# Patient Record
Sex: MALE | Race: WHITE | NOT HISPANIC OR LATINO | Employment: FULL TIME | URBAN - METROPOLITAN AREA
[De-identification: names, ages, dates, MRNs, and addresses within clinical notes are randomized per-mention and may not be internally consistent; named-entity substitution may affect disease eponyms.]

---

## 2017-11-12 ENCOUNTER — HOSPITAL ENCOUNTER (EMERGENCY)
Facility: CLINIC | Age: 27
Discharge: HOME | End: 2017-11-13
Attending: EMERGENCY MEDICINE | Admitting: EMERGENCY MEDICINE
Payer: COMMERCIAL

## 2017-11-12 ENCOUNTER — APPOINTMENT (OUTPATIENT)
Dept: GENERAL RADIOLOGY | Facility: CLINIC | Age: 27
End: 2017-11-12
Attending: EMERGENCY MEDICINE
Payer: COMMERCIAL

## 2017-11-12 VITALS
HEIGHT: 69 IN | RESPIRATION RATE: 18 BRPM | OXYGEN SATURATION: 98 % | BODY MASS INDEX: 20.73 KG/M2 | TEMPERATURE: 98.1 F | DIASTOLIC BLOOD PRESSURE: 75 MMHG | WEIGHT: 140 LBS | SYSTOLIC BLOOD PRESSURE: 129 MMHG
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
## 2017-11-12 DIAGNOSIS — M54.2 CERVICALGIA: ICD-10-CM

## 2017-11-12 PROCEDURE — 99283 EMERGENCY DEPT VISIT LOW MDM: CPT | Mod: Z6 | Performed by: EMERGENCY MEDICINE

## 2017-11-12 PROCEDURE — 99283 EMERGENCY DEPT VISIT LOW MDM: CPT | Performed by: EMERGENCY MEDICINE

## 2017-11-12 PROCEDURE — 72040 X-RAY EXAM NECK SPINE 2-3 VW: CPT

## 2017-11-12 NOTE — ED AVS SNAPSHOT
[facility] Emergency Department    [address] [address]    Phone:  [phone]    Fax:  [phone]                                       Massimo Hinson   MRN: [id number]    Department:  [facility] Emergency Department   Date of Visit:  11/12/2017           Patient Information     Date Of Birth          1990        Your diagnoses for this visit were:     Cervicalgia     Skiing accident, initial encounter        You were seen by Sterling Cuevas MD.        Discharge Instructions       Drink plenty of fluids.  Use acetaminophen and ibuprofen for symptoms.  Return to the emergency department for worsening symptoms or other concerns, otherwise follow up in clinic if not improved in 4-5 days.    24 Hour Appointment Hotline       To make an appointment at any [facility] clinic, call [phone] ([phone]). If you don't have a family doctor or clinic, we will help you find one. [facility] clinics are conveniently located to serve the needs of you and your family.             Review of your medicines      Our records show that you are taking the medicines listed below. If these are incorrect, please call your family doctor or clinic.        Dose / Directions Last dose taken    IBUPROFEN 200 200 MG Tabs        PRN   Refills:  0        NO ACTIVE MEDICATIONS        Refills:  0                Procedures and tests performed during your visit     Cervical spine XR, 2-3 views      Orders Needing Specimen Collection     None      Pending Results     Date and Time Order Name Status Description    11/12/2017 2307 Cervical spine XR, 2-3 views Preliminary             Pending Culture Results     No orders found for last 3 day(s).            Pending Results Instructions     If you had any lab results that were not finalized at the time of your Discharge, you can call the ED Lab Result RN at [phone]. You will be contacted by this team for any positive Lab results or changes in  "treatment. The nurses are available 7 days a week from 10A to 6:30P.  You can leave a message 24 hours per day and they will return your call.        Test Results From Your Hospital Stay        2017 12:09 AM      Narrative     XR CERVICAL SPINE 2-3 VIEWS  2017 11:49 PM      HISTORY: Fell skiing, posterior neck pain around C3-4.      COMPARISON: 10/6/2005.        Impression     IMPRESSION: No acute fracture or malalignment. No prevertebral soft  tissue swelling. No significant interval change.                Thank you for choosing [facility]       Thank you for choosing [facility] for your care. Our goal is always to provide you with excellent care. Hearing back from our patients is one way we can continue to improve our services. Please take a few minutes to complete the written survey that you may receive in the mail after you visit with us. Thank you!        [employer] Information     [employer] lets you send messages to your doctor, view your test results, renew your prescriptions, schedule appointments and more. To sign up, go to [email or web address] . Click on \"Log in\" on the left side of the screen, which will take you to the Welcome page. Then click on \"Sign up Now\" on the right side of the page.     You will be asked to enter the access code listed below, as well as some personal information. Please follow the directions to create your username and password.     Your access code is: [email or web address]  Expires: 2018 12:20 AM     Your access code will  in 90 days. If you need help or a new code, please call your [facility] clinic or [phone].        Care EveryWhere ID     This is your Care EveryWhere ID. This could be used by other organizations to access your [facility] medical records  [id number]        Equal Access to Services     BLAYNE AYALA AH: Regina Braxton, jeri de la garza, puneet shah. So tad " [phone].    ATENCIÓN: Si habla español, tiene a light disposición servicios gratuitos de asistencia lingüística. Llame al [phone].    We comply with applicable federal civil rights laws and [place] laws. We do not discriminate on the basis of race, color, national origin, age, disability, sex, sexual orientation, or gender identity.            After Visit Summary       This is your record. Keep this with you and show to your community pharmacist(s) and doctor(s) at your next visit.

## 2017-11-12 NOTE — ED AVS SNAPSHOT
[facility] Emergency Department    [address] [address]    Phone:  [phone]    Fax:  [phone]                                       Massimo Hinson   MRN: [id number]    Department:  [facility] Emergency Department   Date of Visit:  11/12/2017           After Visit Summary Signature Page     I have received my discharge instructions, and my questions have been answered. I have discussed any challenges I see with this plan with the nurse or doctor.    ..........................................................................................................................................  Patient/Patient Representative Signature      ..........................................................................................................................................  Patient Representative Print Name and Relationship to Patient    ..................................................               ................................................  Date                                            Time    ..........................................................................................................................................  Reviewed by Signature/Title    ...................................................              ..............................................  Date                                                            Time

## 2017-11-13 NOTE — ED PROVIDER NOTES
History     Chief Complaint   Patient presents with     Neck Injury     fell skiing/ hurt neck/ light headed/ 5 hrs ago./ has been smoking marijuana     HPI  Massimo Hinson is a 27 year old male who presents for neck pain.  About 5 hours prior to arrival the patient reports that he fell while skiing.  He was unhelmeted and landed on his face.  No loss of consciousness.  No nausea or vomiting since this happened.  He has not taking pain.  He has smoked marijuana since this happened.  Said prior to arrival he was trying to massage his neck when he became lightheaded and concerned that he could've injured himself.  Pain is mild now, posterior neck, described as aching, lightheadedness has passed.  He has been eating and drinking normally without nausea or vomiting.  No double vision, dizziness, hearing changes, chest pain, difficulty breathing, abdominal pain.    Problem List:    There are no active problems to display for this patient.       Past Medical History:    No past medical history on file.    Past Surgical History:    Past Surgical History:   Procedure Laterality Date     ARTHROSCOPY FINGER Left 1/2/2015    Procedure: ARTHROSCOPY FINGER;  Surgeon: Roman Rosa MD;  Location: [facility] OR     ARTHROSCOPY KNEE RT/LT  10/08    with left ACL reconstruction     FRACTURE TX, ANKLE RT/LT  8/10    Fracture TX Ankle RT, ORIF fibula     REMOVE HARDWARE LOWER EXTREMITY  1/25/2011    REMOVE HARDWARE LOWER EXTREMITY performed by ANISHA NEW at [facility] OR     SURGICAL HISTORY OF -   2005    Pinning of 5th metacarpal fr, right hand     SURGICAL HISTORY OF -   10/09    Revision of left ACL repair,medial meniscectomy       Family History:    Family History   Problem Relation Age of Onset     Breast Cancer Maternal Grandmother      CANCER Maternal Grandmother      lung     DIABETES Maternal Grandfather      Prostate Cancer Maternal Grandfather        Social History:  Marital Status:  Single [1]  Social History  "  Substance Use Topics     Smoking status: Former Smoker     Types: Cigarettes     Quit date: 11/12/2014     Smokeless tobacco: Former User      Comment: 2 cigs daily     Alcohol use No        Medications:      NO ACTIVE MEDICATIONS   IBUPROFEN 200 200 MG OR TABS         Review of Systems  A 4 point review of systems was performed. All pertinent positives and negatives were listed in the HPI and rest of ROS were otherwise negative.    Physical Exam   BP: 129/75  Heart Rate: 104  Temp: 98.1  F (36.7  C)  Resp: 18  Height: 175.3 cm (5' 9\")  Weight: 63.5 kg (140 lb)  SpO2: 98 %      Physical Exam  /75  Temp 98.1  F (36.7  C) (Temporal)  Resp 18  Ht 1.753 m (5' 9\")  Wt 63.5 kg (140 lb)  SpO2 98%  BMI 20.67 kg/m2   GENERAL: Alert, cooperative, mild distress  HEAD: No scalp laceration, hematoma, or abrasion.  No tenderness.  EYES: Conjunctivae clear, EOM intact. PERLL, Pupils are 3 mm.  HEENT:  Normal external ears, normal TM's without evidence of hemotympanum.  No nasal discharge or evidence of septal hematoma. No facial instability or asymmetry. No evidence of intraoral trauma.  Intact bite without malalignment.  NECK: Mild midline tenderness, no lateral tenderness.  CHEST:  No crepitus or tenderness with AP or lateral compression  CARDIOVASCULAR : Nml rate, distal pulses are normal and symmetric  LUNGS: No respiratory distress.  GI: Soft, ND, NT.   PELVIS: No crepitus or tenderness with AP or lateral compression  BACK: No step-offs palpated, no tenderness to palpation of thoracic or lumbar spine.  EXTREMITIES: No obvious deformities, no tenderness to palpation.  NEUROLOGICAL : GCS= 15.  Awake, alert, and oriented x 3.  Cranial nerves II-XII grossly intact. Normal muscle strength and tone, sensation to light touch grossly intact in all extremities.  No focal deficits.   SKIN : Normal color, no jaundice or rash. No abrasions.      ED Course     ED Course     Procedures               Critical Care time:  " none               Labs Ordered and Resulted from Time of ED Arrival Up to the Time of Departure from the ED - No data to display    Assessments & Plan (with Medical Decision Making)   27-year-old male presents for evaluation of neck pain after skiing accident.  Differential includes soft tissue injury, cervical spine fracture, muscle spasms.  Temperature is 36.7 C, blood pressure 129/75, heart rate 104, SPO2 98% on room air.  X-ray of the cervical spine obtained images reviewed in the family as well as radiology reviewed, no signs of fracture dislocation.  Recheck patient is resting comfortably, he is safe to discharge home with instructions to use acetaminophen and ibuprofen for symptoms, otherwise follow up in clinic.  The patient is in agreement with this plan.    I have reviewed the nursing notes.    I have reviewed the findings, diagnosis, plan and need for follow up with the patient.       Discharge Medication List as of 11/13/2017 12:22 AM          Final diagnoses:   Cervicalgia   Skiing accident, initial encounter       11/12/2017   [facility] EMERGENCY DEPARTMENT     Sterling Cuevas MD  11/13/17 0029

## 2017-11-13 NOTE — DISCHARGE INSTRUCTIONS
Drink plenty of fluids.  Use acetaminophen and ibuprofen for symptoms.  Return to the emergency department for worsening symptoms or other concerns, otherwise follow up in clinic if not improved in 4-5 days.

## 2020-11-17 ENCOUNTER — COMMUNICATION - HEALTHEAST (OUTPATIENT)
Dept: TELEHEALTH | Facility: CLINIC | Age: 30
End: 2020-11-17

## 2020-11-17 ENCOUNTER — RECORDS - HEALTHEAST (OUTPATIENT)
Dept: GENERAL RADIOLOGY | Facility: CLINIC | Age: 30
End: 2020-11-17

## 2020-11-17 ENCOUNTER — OFFICE VISIT - HEALTHEAST (OUTPATIENT)
Dept: FAMILY MEDICINE | Facility: CLINIC | Age: 30
End: 2020-11-17

## 2020-11-17 DIAGNOSIS — M54.50 LUMBAR PAIN: ICD-10-CM

## 2020-11-17 DIAGNOSIS — M54.2 CERVICALGIA: ICD-10-CM

## 2020-11-17 DIAGNOSIS — M54.50 LOW BACK PAIN: ICD-10-CM

## 2020-11-17 DIAGNOSIS — M54.2 NECK PAIN: ICD-10-CM

## 2020-11-17 ASSESSMENT — MIFFLIN-ST. JEOR: SCORE: 1603.17

## 2020-11-18 ENCOUNTER — HOSPITAL ENCOUNTER (OUTPATIENT)
Dept: PHYSICAL MEDICINE AND REHAB | Facility: CLINIC | Age: 30
Discharge: HOME | End: 2020-11-18
Attending: NURSE PRACTITIONER

## 2020-11-18 DIAGNOSIS — G89.29 CHRONIC RIGHT-SIDED LOW BACK PAIN WITHOUT SCIATICA: ICD-10-CM

## 2020-11-18 DIAGNOSIS — M54.50 CHRONIC RIGHT-SIDED LOW BACK PAIN WITHOUT SCIATICA: ICD-10-CM

## 2020-11-18 DIAGNOSIS — M79.18 MYOFASCIAL PAIN ON RIGHT SIDE: ICD-10-CM

## 2020-11-18 DIAGNOSIS — M54.2 NECK PAIN ON RIGHT SIDE: ICD-10-CM

## 2020-11-18 ASSESSMENT — MIFFLIN-ST. JEOR: SCORE: 1613.1

## 2020-11-27 ENCOUNTER — OFFICE VISIT - HEALTHEAST (OUTPATIENT)
Dept: PHYSICAL THERAPY | Facility: CLINIC | Age: 30
End: 2020-11-27

## 2020-11-27 DIAGNOSIS — G89.29 CHRONIC MIDLINE LOW BACK PAIN WITHOUT SCIATICA: ICD-10-CM

## 2020-11-27 DIAGNOSIS — M54.50 CHRONIC MIDLINE LOW BACK PAIN WITHOUT SCIATICA: ICD-10-CM

## 2020-11-27 DIAGNOSIS — M54.2 NECK PAIN: ICD-10-CM

## 2020-12-04 ENCOUNTER — OFFICE VISIT - HEALTHEAST (OUTPATIENT)
Dept: PHYSICAL THERAPY | Facility: CLINIC | Age: 30
End: 2020-12-04

## 2020-12-04 DIAGNOSIS — G89.29 CHRONIC MIDLINE LOW BACK PAIN WITHOUT SCIATICA: ICD-10-CM

## 2020-12-04 DIAGNOSIS — M54.50 CHRONIC MIDLINE LOW BACK PAIN WITHOUT SCIATICA: ICD-10-CM

## 2020-12-04 DIAGNOSIS — M54.2 NECK PAIN: ICD-10-CM

## 2020-12-09 ENCOUNTER — OFFICE VISIT - HEALTHEAST (OUTPATIENT)
Dept: PHYSICAL THERAPY | Facility: CLINIC | Age: 30
End: 2020-12-09

## 2020-12-09 DIAGNOSIS — G89.29 CHRONIC MIDLINE LOW BACK PAIN WITHOUT SCIATICA: ICD-10-CM

## 2020-12-09 DIAGNOSIS — M54.2 NECK PAIN: ICD-10-CM

## 2020-12-09 DIAGNOSIS — M54.50 CHRONIC MIDLINE LOW BACK PAIN WITHOUT SCIATICA: ICD-10-CM

## 2020-12-11 ENCOUNTER — OFFICE VISIT - HEALTHEAST (OUTPATIENT)
Dept: PHYSICAL THERAPY | Facility: CLINIC | Age: 30
End: 2020-12-11

## 2020-12-11 DIAGNOSIS — M54.50 CHRONIC MIDLINE LOW BACK PAIN WITHOUT SCIATICA: ICD-10-CM

## 2020-12-11 DIAGNOSIS — M54.2 NECK PAIN: ICD-10-CM

## 2020-12-11 DIAGNOSIS — G89.29 CHRONIC MIDLINE LOW BACK PAIN WITHOUT SCIATICA: ICD-10-CM

## 2020-12-18 ENCOUNTER — OFFICE VISIT - HEALTHEAST (OUTPATIENT)
Dept: PHYSICAL THERAPY | Facility: CLINIC | Age: 30
End: 2020-12-18

## 2020-12-18 DIAGNOSIS — G89.29 CHRONIC MIDLINE LOW BACK PAIN WITHOUT SCIATICA: ICD-10-CM

## 2020-12-18 DIAGNOSIS — M54.2 NECK PAIN: ICD-10-CM

## 2020-12-18 DIAGNOSIS — M54.50 CHRONIC MIDLINE LOW BACK PAIN WITHOUT SCIATICA: ICD-10-CM

## 2020-12-30 ENCOUNTER — OFFICE VISIT - HEALTHEAST (OUTPATIENT)
Dept: PHYSICAL THERAPY | Facility: CLINIC | Age: 30
End: 2020-12-30

## 2020-12-30 DIAGNOSIS — M54.50 CHRONIC MIDLINE LOW BACK PAIN WITHOUT SCIATICA: ICD-10-CM

## 2020-12-30 DIAGNOSIS — M54.2 NECK PAIN: ICD-10-CM

## 2020-12-30 DIAGNOSIS — G89.29 CHRONIC MIDLINE LOW BACK PAIN WITHOUT SCIATICA: ICD-10-CM

## 2021-01-22 ENCOUNTER — OFFICE VISIT - HEALTHEAST (OUTPATIENT)
Dept: PHYSICAL THERAPY | Facility: CLINIC | Age: 31
End: 2021-01-22

## 2021-01-22 DIAGNOSIS — M54.2 NECK PAIN: ICD-10-CM

## 2021-01-22 DIAGNOSIS — G89.29 CHRONIC MIDLINE LOW BACK PAIN WITHOUT SCIATICA: ICD-10-CM

## 2021-01-22 DIAGNOSIS — M54.50 CHRONIC MIDLINE LOW BACK PAIN WITHOUT SCIATICA: ICD-10-CM

## 2021-06-05 VITALS
HEIGHT: 69 IN | SYSTOLIC BLOOD PRESSURE: 100 MMHG | OXYGEN SATURATION: 97 % | BODY MASS INDEX: 21.33 KG/M2 | DIASTOLIC BLOOD PRESSURE: 68 MMHG | WEIGHT: 144 LBS | HEART RATE: 70 BPM

## 2021-06-05 VITALS — HEIGHT: 69 IN | BODY MASS INDEX: 21.48 KG/M2 | WEIGHT: 145 LBS

## 2021-06-13 NOTE — PROGRESS NOTES
ASSESSMENT: Massimo Hinson is a 30 y.o. male who presents for consultation at the request of PCP Elisha Lopez CNP, with a past medical history significant for former smoker, gluten intolerance, who presents today for new patient evaluation of:    -Chronic right low back pain x3 years with increased pain with facet loading on exam indicating facet mediated pain.    -Chronic intermittent right neck pain x1.5 years also consistent with facet mediated pain and myofascial pain.    Patient is neurologically intact on exam. No myelopathic or red flag symptoms.     MARLY Score: 12  NDI Score: 22    WHO 5: 21     Diagnoses and all orders for this visit:    Chronic right-sided low back pain without sciatica  -     Ambulatory referral to PT/OT    Neck pain on right side  -     Ambulatory referral to PT/OT    Myofascial pain on right side  -     Ambulatory referral to PT/OT      PLAN:  Reviewed spine anatomy and disease process. Discussed diagnosis and treatment options with the patient today. A shared decision making model was used.  The patient's values and choices were respected. The following represents what was discussed and decided upon by the provider and the patient.      -DIAGNOSTIC TESTS:  Images were personally reviewed and interpreted and explained to patient today using spine model.   --Discussed that if neck or back pain worsens or does not improve with physical therapy would recommend cervical and lumbar spine MRI however recommend holding off and trialing physical therapy at this point as he has not trialed physical therapy for either issue.  --Cervical spine x-ray 11/17/2020 with straightening cervical lordosis, normal disc height and sagittal alignment.  --Lumbar spine x-ray 11/17/2020 with normal alignment and disc height.    -PHYSICAL THERAPY: Referral to physical therapy [facility] to establish home exercises for core strengthening and myofascial pain.  Discussed the importance of core  strengthening, ROM, stretching exercises with the patient and how each of these entities is important in decreasing pain.  Explained to the patient that the purpose of physical therapy is to teach the patient a home exercise program.  These exercises need to be performed every day in order to decrease pain and prevent future occurrences of pain.        -MEDICATIONS: No changes in medication advised patient to trial methocarbamol which was prescribed yesterday via PCP for myofascial pain 3 times daily.  -Could consider prescription NSAID down the road however patient wants to hold off today.  Discussed multiple medication options today with patient. Discussed risks, side effects, and proper use of medications. Patient verbalized understanding.    -INTERVENTIONS: No recommendations for spine injections at this time.  Discussed risks and benefits of injections with patient today.    -PATIENT EDUCATION:  45 minutes of total visit time was spent face to face with the patient today, greater than 50% of total time spent with patient was spent on counseling, education, and coordinating care.   -10 minutes spent outside of visit time, non-face-to-face time, reviewing chart.  -Today we also discussed the issues related to the current COVID-19 pandemic, the pros and cons of the current treatment plan, the CDC guidelines such as social distancing, washing the hands, and covering the cough.  -The patient was masked and proper PPE was worn by the provider for the duration of this visit including a surgical mask, gloves, and protective eyewear.    -FOLLOW-UP:   Follow-up if symptoms or not improving in 4 weeks or worsening at any time.    Advised patient to call the [facility] if symptoms worsen or you have problems controlling bladder and bowel function.   ______________________________________________________________________    SUBJECTIVE:  HPI:  Massimo PALACIOS Sravan  Is a 30 y.o. male who presents today for new patient evaluation  of low back pain right low back greatest at the lumbosacral junction rating to the right buttock with increased pain with sharp movements however pain is intermittent.  He reports it as a dull sharp type of pain that is also worse with bending forward.  If he bends slowly he reports his symptoms are much more tolerable.  His pain does get up to a 7 at its worst, a 5 at its best he reports but it is intermittent.  Patient denies lower extremity pain, denies recent trips falls or balance changes denies lower extremity numbness or tingling, denies bowel or bladder loss control.    Patient also endorsing right-sided neck pain lower cervical spine at C6 and C7 region ongoing for the last year and a half.  Patient does report a probable fall when he was in [place] however he does not recall but woke up with a very swollen face and was having some neck pain ongoing since then.  He does report a grinding/cracking sensation when he moves his neck and tightness on the right side.  Patient denies any pain rating to the shoulder or the right arm, denies upper extremity numbness or tingling, denies upper extremity weakness.      -Treatment to Date: No prior spinal surgery or spinal injection.  No prior physical therapy for neck or back issues.  Long-term chiropractic treatments with some relief low back, minimal relief with neck symptoms.    -Medications:  Methocarbamol prescriber PCP however patient has not trialed yet  Ibuprofen intermittently for low back pain with short-term relief only    Current Outpatient Medications on File Prior to Encounter   Medication Sig Dispense Refill     methocarbamoL (ROBAXIN) 500 MG tablet Take 1 tablet (500 mg total) by mouth 3 (three) times a day. As needed 30 tablet 0     No current facility-administered medications on file prior to encounter.        Allergies   Allergen Reactions     Gluten Other (See Comments)     Doesn't sit well with him        History reviewed. No pertinent past medical  "history.     Patient Active Problem List   Diagnosis     Alcohol dependence (H)       Past Surgical History:   Procedure Laterality Date     ARTHROSCOPIC REPAIR ACL Left      FINGER SURGERY Right      KNEE CARTILAGE SURGERY Left        Family History   Problem Relation Age of Onset     No Medical Problems Mother      No Medical Problems Father      Brain cancer Maternal Grandmother      Skin cancer Maternal Grandfather        Reviewed past medical, surgical, and family history with patient found on new patient intake packet located in EMR Media tab.     SOCIAL HX: Patient is single, does work as a  but is thinking about getting a different job as he knows this is wearing on his body.  Patient does report history of tobacco smoking has since stopped.  Patient drinks about 15 beers a week, denies history of being a heavy drinker, does report occasional marijuana use.    ROS: Positive for muscle fatigue.  Specifically negative for bowel/bladder dysfunction, balance changes, headache, dizziness, foot drop, fevers, chills, appetite changes, nausea/vomiting, unexplained weight loss. Otherwise 13 systems reviewed are negative. Please see the patient's intake questionnaire from today for details.    OBJECTIVE:  /70 (Patient Site: Right Arm, Patient Position: Sitting)   Ht 5' 9\" (1.753 m)   Wt 145 lb (65.8 kg)   BMI 21.41 kg/m      PHYSICAL EXAMINATION:    --CONSTITUTIONAL:  Vital signs as above.  No acute distress.  The patient is well nourished and well groomed.  --PSYCHIATRIC:  Appropriate mood and affect. The patient is awake, alert, oriented to person, place, time and answering questions appropriately with clear speech.    --SKIN:  Skin over the face, bilateral lower extremities, and posterior torso is clean, dry, intact without rashes.    --RESPIRATORY: Normal rhythm and effort. No abnormal accessory muscle breathing patterns noted.   --ABDOMINAL:  Soft, non-distended, non-tender throughout all quadrants. "   --STANDING EXAMINATION:  Normal lumbar lordosis noted, no lateral shift.  --MUSCULOSKELETAL: Lumbar spine inspection reveals no evidence of deformity. Range of motion is not limited in lumbar flexion, extension, increased pain with right lateral rotation and extension simultaneously.  Mild tenderness to palpation right lumbosacral junction. Straight leg raising in the supine position is negative to radicular pain. Sciatic notch non-tender.  --SACROILIAC JOINT:  One Finger point test negative.  --GROSS MOTOR: Gait is non-antalgic. Easily arises from a seated position.   --LOWER EXTREMITY MOTOR TESTING:  Plantar flexion left 5/5, right 5/5   Dorsiflexion left 5/5, right 5/5   Great toe MTP extension left 5/5, right 5/5  Knee flexion left 5/5, right 5/5  Knee extension left 5/5, right 5/5   Hip flexion left 5/5, right 5/5  Hip abduction left 5/5, right 5/5  Hip adduction left 5/5, right 5/5   --HIPS: Full range of motion bilaterally. Negative FABERs on the involved lower extremity.   --NEUROLOGICAL:  2/4 patellar, medial hamstring, and achilles reflexes bilaterally.  Sensation to light touch is intact in the bilateral L4, L5, and S1 dermatomes. Babinski is negative. No clonus.  Negative Juan Luis reflex bilaterally.  --VASCULAR:  2/4 dorsalis pedis and posterior tibialsi pulses bilaterally.  Bilateral lower extremities are warm.  There is no pitting edema of the bilateral lower extremities.    CERVICAL:   --HEENT:  Sclera are non-injected.  Extraocular muscles are intact.  Thyroid moves easily upon swallowing.  Moist oral mucosa.  --SKIN:  Skin over the face, bilateral upper extremities, and posterior torso is clean, dry, intact without rashes.  --UPPER EXTREMITY MOTOR TESTING:  Wrist flexion left 5/5, right 5/5  Wrist extension left 5/5, right 5/5  Pronators left 5/5, right 5/5  Biceps left 5/5, right 5/5   Triceps left 5/5, right 5/5   Shoulder abduction left 5/5, right 5/5   left 5/5, right 5/5  --NEUROLOGIC:   CN III-XII are grossly intact.  Bilateral patellar and achilles reflexes are physiologic and symmetric. 2/4 symmetric biceps, brachioradialis, triceps reflexes bilaterally.  Sensation to upper extremities is intact.  Negative Nunez's bilaterally.    --VASCULAR:  2/4 radial pulses bilaterally.  Warm upper limbs bilaterally.  Capillary refill in the upper extremities is less than 1 second. No obvious lower extremity swelling or varicosities.   --CERVICAL SPINE: Inspection reveals no evidence of deformity. Range of motion is not limited in cervical flexion, extension, or lateral rotation.  Mild tenderness to palpation right C6 and C7 region and right trapezius muscle tautness.  Spurlings maneuver is negative to radicular pain.    --SHOULDERS: Full range of motion bilaterally. Negative empty can.    RESULTS: Prior medical records from [facility] 11/17/2020 to current and care everywhere were reviewed today.    Imaging: Lumbar spine Imaging was personally reviewed and interpreted today. The images were shown to the patient and the findings were explained using a spine model.      Xr Cervical Spine 2 - 3 Vws  Xr Lumbar Spine 2 Or 3 Vws  Result Date: 11/17/2020   INDICATION: neck pain for 1 1/2 years s/p injury. Low back pain. COMPARISON: None.   Cervical spine: Patient's head is slightly tilted to the right. Straightening of the normal cervical lordosis. Normal sagittal alignment. Intervertebral disc spaces are maintained. Vertebral body heights are maintained. No prevertebral soft tissue edema.  The dens is within normal limits on the open-mouth view. No convincing facet arthropathy. Lumbar spine: 5 lumbar type vertebrae. Normal sagittal alignment. Vertebral body heights are maintained. No pars defects. Intravertebral disc spaces are maintained. The visualized bony pelvis is grossly within normal limits.

## 2021-06-13 NOTE — PROGRESS NOTES
[facility] Rehabilitation Daily Progress Note  Patient Name: Massimo Hinson  Date of evaluation: 11/27/2020  Today's Date: 12/18/2020  Visit Number: 5 of 16 through 2- per medicaid Cert  Referring provider: Nisreen Schulte CNP  Referral Diagnosis: Neck and Back Pain (Non MedX on Order)  Visit Diagnosis:     ICD-10-CM    1. Neck pain  M54.2    2. Chronic midline low back pain without sciatica  M54.5     G89.29        Assessment:       Patient showing good tolerance to initiation of Medx program. He does demonstrate weakness with these, fatiguing quickly. Working on HEP, he is noticing muscle soreness with these exercises.       Patient's expectations/goals are: Realistic  Barriers to Learning or Achieving Goals: none    Goals:  Pt. will demonstrate/verbalize independence in self-management of condition in : 12 weeks  Pt. will be independent with home exercise program in : 12 weeks    Pt will: improve neck pain in order to lift at work as a  without pain at the end of the day in 12 weeks  Pt will: improve LBP as noted by having less frequent trips to chiropractor for back pain in order to play with SO's son in 12 weeks         Plan:        Plan for next visit: continue with home program  Continue with all 4 machines. Reformer work.  Focus on strengthening/stability.        Patient is in agreement with PT plan of care and goals.   Continue per POC.       Subjective:        Back and neck are feeling good.      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        Objective:       Lumbar MedX:  Enter Week / Visit #: Wk 2 V 2  Weight (lbs): 106#  Reps (#): 14  ROM (degrees): 0-72  Pain: tired    Cervical MedX:  Enter Week / Visit #: Wk 2 V 2  Weight (lbs): 198# increased to 210# d/t too easy  Reps (#): 20  Time: 135  ROM (degrees): 0-90  Pain: no pain    Exercises:  Exercise #1: treadmill  4:00 warm up  Comment #1: rotary torso 90 sec starting left, 34#  Exercise #2: 4 way neck SB 34#  seat 2, chest pad 3, Flexion, seat 2, chest pad 6  Comment #2: front planks 30-60 sec x3 HEP  Exercise #3: supermans repeats for 30-60 sec x 3 HEP  Comment #3: side planks with top leg lifting and lowering for 30-60 sec x3 HEP  Exercise #4: side stepping band walks with slight squat orange stepping for 30-60 sec x3 HEP  Comment #4: reformer: reverse crunch no springs, 15x  Exercise #5: reformer: standing hip abduction 1R1B 15x bilat  Comment #5: reformer: standing hip adduction no springs 10x bilat  Exercise #6: reformer: tall kneeling straight arm rows 10x 1R1B    Treatment Today     TREATMENT MINUTES COMMENTS   Evaluation     Self-care/ Home management  Ed on using tennis ball for STM on right QL for a few minutes when sore.   Ed on his spine. Discussed that his vertebrae is not shifting around all over the place (he has been told this in the past) PT tried to explain what is meant by this when we say this in the medical world.   Ed on facet joint function, disc function and all the ligaments/muscles surrounding the spine and how it relates to movement and his spine.   Ed on importance of posture when sitting for long periods of time  Ed on how strengthening/PT can help his pain/spine function   Manual therapy     Neuromuscular Re-education     Therapeutic Activity     Therapeutic Exercises 30 MedX cervical and lumbar     HEP:  Band walk with orange band  Front planks on elbows  Side planks on bent knee with top leg lifting  Supermans    do repetitions for 30 seconds.  Repeat 3 times.  5 days per week.   Gait training     Modality__________________                Total 30 Patient late to session 10 min (had opening following, able to see him longer) Discussed with patient the importance of coming on time to get the most out of his sessions.   Blank areas are intentional and mean the treatment did not include these  items.            Jessica Franco, PT  12/18/2020

## 2021-06-13 NOTE — PROGRESS NOTES
[facility] Rehabilitation Daily Progress Note  Patient Name: Massimo Hinson  Date of evaluation: 11/27/2020  Today's Date: 12/11/2020  Visit Number: 4 of 16 through 2- per medicaid Cert  Referring provider: Nisreen Schulte CNP  Referral Diagnosis: Neck and Back Pain (Non MedX on Order)  Visit Diagnosis:     ICD-10-CM    1. Neck pain  M54.2    2. Chronic midline low back pain without sciatica  M54.5     G89.29        Assessment:       Patient showing good tolerance to initiation of Medx program. He does demonstrate weakness with these, fatiguing quickly. Started HEP.       Patient's expectations/goals are: Realistic  Barriers to Learning or Achieving Goals: none    Goals:  Pt. will demonstrate/verbalize independence in self-management of condition in : 12 weeks  Pt. will be independent with home exercise program in : 12 weeks    Pt will: improve neck pain in order to lift at work as a  without pain at the end of the day in 12 weeks  Pt will: improve LBP as noted by having less frequent trips to chiropractor for back pain in order to play with SO's son in 12 weeks         Plan:        Plan for next visit: Start home program  Continue with all 4 machines. Add sidebending to cervical spine machine. Add reformer.   Focus on strengthening/stability.        Patient is in agreement with PT plan of care and goals.   Continue per POC.       Subjective:       Feeling good today, had a light lifting day today with work.     Functional limitations:   Moving items for work is challenging. Does have a 4.5 year old (gf's child) that he likes to play with and at times can feel pain with these activities.        Objective:       Lumbar MedX:  Enter Week / Visit #: Wk 1 V 2  Weight (lbs): 100#  Reps (#): 14  ROM (degrees): 0-72  Pain: tired    Cervical MedX:  Enter Week / Visit #: Wk 1 V 2  Weight (lbs): 192#  Reps (#): 20  Time: 135  ROM (degrees): 0-90  Pain: tired    Exercises:  Exercise #1: treadmill 4:00  warm up  Comment #1: rotary torso 90 sec starting right, 30#  Exercise #2: 4 way neck SB 20# (easy) seat 2, chest pad 3, Flexion (not done today for time), seat 2, chest pad 6    Treatment Today     TREATMENT MINUTES COMMENTS   Evaluation     Self-care/ Home management  Ed on using tennis ball for STM on right QL for a few minutes when sore.   Ed on his spine. Discussed that his vertebrae is not shifting around all over the place (he has been told this in the past) PT tried to explain what is meant by this when we say this in the medical world.   Ed on facet joint function, disc function and all the ligaments/muscles surrounding the spine and how it relates to movement and his spine.   Ed on importance of posture when sitting for long periods of time  Ed on how strengthening/PT can help his pain/spine function   Manual therapy     Neuromuscular Re-education     Therapeutic Activity     Therapeutic Exercises 30 MedX cervical and lumbar initiated    Band walk with orange band  Front planks on elbows  Side planks on bent knee with top leg lifting  Supermans    do repetitions for 30 seconds.  Repeat 3 times.  5 days per week.   Gait training     Modality__________________                Total 30 Patient late to session 10 min (had opening following, able to see him longer) Discussed with patient the importance of coming on time to get the most out of his sessions.   Blank areas are intentional and mean the treatment did not include these items.            Jessica Franco, PT  12/11/2020

## 2021-06-13 NOTE — PROGRESS NOTES
"Assessment and Plan:     1. Lumbar pain  methocarbamoL (ROBAXIN) 500 MG tablet    XR Lumbar Spine 2 or 3 VWS   2. Neck pain  methocarbamoL (ROBAXIN) 500 MG tablet    XR Cervical Spine 2 - 3 VWS     Differentials include myofascial pain, bulging or herniated disc.  Will treat with methocarbamol as needed.  He is to avoid taking this with other sedatives.  Will refer to spine clinic for further evaluation.  Discussed symptomatic treatment including rest, ice, stretching activities.  He is content with the plan.    Subjective:     Massimo is a 30 y.o. male presenting to the clinic for multiple concerns today.  Patient developed neck pain 1 and half years ago.  Patient states he was in [place] and had been consuming alcohol.  He did not remember the events of the night.  He woke up and his face was swollen and his neck was in pain.  He did have x-rays performed which were negative.  Since then, he has had a grinding and cracking sensation when he moves his neck.  He feels a tightness within the right side of his neck when he extends his neck.  He occasionally has a dull ache within his neck.  Patient has also been experiencing intermittent low back pain since.  Pain is primarily on the right side.  He describes the pain as a dull ache.  He was told by a chiropractor that a vertebrae keeps \"popping out \".  Standing improves the pain.  Sitting exacerbates the pain.  He denies numbness and tingling of his extremities.  He denies any urinary or fecal incontinence or retention.    Review of Systems: A complete 14 point review of systems was obtained and is negative or as stated in the history of present illness.    Social History     Socioeconomic History     Marital status: Single     Spouse name: Not on file     Number of children: Not on file     Years of education: Not on file     Highest education level: Not on file   Occupational History     Not on file   Social Needs     Financial resource strain: Not on file     Food " "insecurity     Worry: Not on file     Inability: Not on file     Transportation needs     Medical: Not on file     Non-medical: Not on file   Tobacco Use     Smoking status: Former Smoker     Smokeless tobacco: Never Used   Substance and Sexual Activity     Alcohol use: Yes     Alcohol/week: 20.0 standard drinks     Types: 20 Cans of beer per week     Drug use: Yes     Types: Marijuana     Sexual activity: Not on file     Comment: single   Lifestyle     Physical activity     Days per week: Not on file     Minutes per session: Not on file     Stress: Not on file   Relationships     Social connections     Talks on phone: Not on file     Gets together: Not on file     Attends Buddhist service: Not on file     Active member of club or organization: Not on file     Attends meetings of clubs or organizations: Not on file     Relationship status: Not on file     Intimate partner violence     Fear of current or ex partner: Not on file     Emotionally abused: Not on file     Physically abused: Not on file     Forced sexual activity: Not on file   Other Topics Concern     Not on file   Social History Narrative     Not on file       Active Ambulatory Problems     Diagnosis Date Noted     Alcohol dependence (H) 11/17/2020     Resolved Ambulatory Problems     Diagnosis Date Noted     No Resolved Ambulatory Problems     No Additional Past Medical History       Family History   Problem Relation Age of Onset     No Medical Problems Mother      No Medical Problems Father      Brain cancer Maternal Grandmother      Skin cancer Maternal Grandfather        Objective:     /68 (Patient Site: Left Arm, Patient Position: Sitting, Cuff Size: Adult Regular)   Pulse 70   Ht 5' 8.66\" (1.744 m)   Wt 144 lb (65.3 kg)   SpO2 97%   BMI 21.48 kg/m      Patient is alert, in no obvious distress.   Skin: Warm, dry.    Lungs:  Clear to auscultation. Respirations even and unlabored.  No wheezing or rales noted.   Heart:  Regular rate and " rhythm.  No murmurs.       Musculoskeletal:  Full ROM of extremities.  No obvious deformity.  He is nontender to palpation of the musculature of the neck.  He is tender to palpation of his right lower lumbar paraspinous muscles.  He is able to heel toe walk without difficulty.  Straight leg raise is negative bilaterally.  Patella and Achilles DTRs +2, symmetrical.    LABORATORY: I ordered and personally reviewed cervical spine and lumbar spine x-rays showing no obvious fracture.  Will have radiology review.

## 2021-06-13 NOTE — PROGRESS NOTES
[facility] Rehabilitation Daily Progress Note  Patient Name: Massimo Hinson  Date of evaluation: 11/27/2020  Today's Date: 12/9/2020  Visit Number: 3 of 16 through 2- per medicaid Cert  Referring provider: Nisreen Schulte CNP  Referral Diagnosis: Neck and Back Pain (Non MedX on Order)  Visit Diagnosis:     ICD-10-CM    1. Neck pain  M54.2    2. Chronic midline low back pain without sciatica  M54.5     G89.29        Assessment:       Patient showing good tolerance to initiation of Medx program. He does demonstrate weakness with these, fatiguing quickly. PT has yet to start HEP, as the patient has been late to first appointments.        Patient's expectations/goals are: Realistic  Barriers to Learning or Achieving Goals: none    Goals:  Pt. will demonstrate/verbalize independence in self-management of condition in : 12 weeks  Pt. will be independent with home exercise program in : 12 weeks    Pt will: improve neck pain in order to lift at work as a  without pain at the end of the day in 12 weeks  Pt will: improve LBP as noted by having less frequent trips to chiropractor for back pain in order to play with SO's son in 12 weeks         Plan:        Plan for next visit: Start home program  Continue with all 4 machines. Add sidebending to cervical spine machine. Add reformer.   Focus on strengthening/stability.        Patient is in agreement with PT plan of care and goals.   Continue per POC.       Subjective:         Felt okay with the machines last time.     Functional limitations:   Moving items for work is challenging. Does have a 4.5 year old (gf's child) that he likes to play with and at times can feel pain with these activities.          Objective:       Lumbar MedX:  Enter Week / Visit #: Wk 1 V 2  Weight (lbs): 100#  Reps (#): 14  ROM (degrees): 0-72  Pain: tired    Cervical MedX:  Enter Week / Visit #: Wk 1 V 2  Weight (lbs): 192#  Reps (#): 20  Time: 135  ROM (degrees): 0-90  Pain:  tired    Exercises:  Exercise #1: treadmill 4:00 warm up  Comment #1: rotary torso 90 sec starting right, 30#  Exercise #2: 4 way neck SB 20# (easy) seat 2, chest pad 3, Flexion (not done today for time), seat 2, chest pad 6    Treatment Today     TREATMENT MINUTES COMMENTS   Evaluation     Self-care/ Home management  Ed on using tennis ball for STM on right QL for a few minutes when sore.   Ed on his spine. Discussed that his vertebrae is not shifting around all over the place (he has been told this in the past) PT tried to explain what is meant by this when we say this in the medical world.   Ed on facet joint function, disc function and all the ligaments/muscles surrounding the spine and how it relates to movement and his spine.   Ed on importance of posture when sitting for long periods of time  Ed on how strengthening/PT can help his pain/spine function   Manual therapy     Neuromuscular Re-education     Therapeutic Activity     Therapeutic Exercises 24 MedX cervical and lumbar initiated   Gait training     Modality__________________                Total 24 Patient late to session 7 min   Blank areas are intentional and mean the treatment did not include these items.            Frankie HALL, PT  12/9/2020

## 2021-06-13 NOTE — PROGRESS NOTES
[facility] Rehabilitation Daily Progress Note  Patient Name: Massimo Hinson  Date of evaluation: 11/27/2020  Today's Date: 12/4/2020  Visit Number: 2 of 16 through 2- per medicaid Cert  Referring provider: Nisreen Schulte CNP  Referral Diagnosis: Neck and Back Pain (Non MedX on Order)  Visit Diagnosis:     ICD-10-CM    1. Neck pain  M54.2    2. Chronic midline low back pain without sciatica  M54.5     G89.29        Assessment:      Patient was able to start all 4 machines today. He had no increase in pain with any of them, but noted signficaint fatigue. Patient continues to benefit from further PT.        Patient's expectations/goals are: Realistic  Barriers to Learning or Achieving Goals: none    Goals:  Pt. will demonstrate/verbalize independence in self-management of condition in : 12 weeks  Pt. will be independent with home exercise program in : 12 weeks    Pt will: improve neck pain in order to lift at work as a  without pain at the end of the day in 12 weeks  Pt will: improve LBP as noted by having less frequent trips to chiropractor for back pain in order to play with SO's son in 12 weeks         Plan:        Plan for next visit: Start home program  Continue with all 4 machines. Add sidebending to cervical spine machine. Add reformer.   Focus on strengthening/stability.        Patient is in agreement with PT plan of care and goals.   Continue per POC.       Subjective:       Feeling pretty good today. Hasn't had many jobs for moving lately, this seem to help hiss pain.      Functional limitations:   Moving items for work is challenging. Does have a 4.5 year old (gf's child) that he likes to play with and at times can feel pain with these activities.            Objective:       Lumbar MedX:  Enter Week / Visit #: Wk 1 V 1  Weight (lbs): 100#  Reps (#): 10, challenging  ROM (degrees): 0-72  Pain: tired    Cervical MedX:  Enter Week / Visit #: TEST: Wk 1 V 1  Weight (lbs): 240  Reps (#):  10 (tiring!)  ROM (degrees): 0-90  Pain: tired    Exercises:  Exercise #1: treadmill 4:00 warm up  Comment #1: rotary torso 90 sec starting left, 30#  Exercise #2: 4 way neck 20# (easy) seat 2, chest pad 6    Treatment Today     TREATMENT MINUTES COMMENTS   Evaluation     Self-care/ Home management  Ed on using tennis ball for STM on right QL for a few minutes when sore.   Ed on his spine. Discussed that his vertebrae is not shifting around all over the place (he has been told this in the past) PT tried to explain what is meant by this when we say this in the medical world.   Ed on facet joint function, disc function and all the ligaments/muscles surrounding the spine and how it relates to movement and his spine.   Ed on importance of posture when sitting for long periods of time  Ed on how strengthening/PT can help his pain/spine function   Manual therapy     Neuromuscular Re-education     Therapeutic Activity     Therapeutic Exercises 23 MedX cervical and lumbar initiated   Gait training     Modality__________________                Total 23 Patient late to session   Blank areas are intentional and mean the treatment did not include these items.            Jessica Franco, PT, CLT  12/4/2020

## 2021-06-13 NOTE — PATIENT INSTRUCTIONS - HE
~Please call our [facility] Nurse Navigation line [phone] with any questions or concerns about your treatment plan, if symptoms worsen and you would like to be seen urgently, or if you have problems controlling bladder and bowel function.      You have been referred for Physical Therapy to [facility]. They will call you to schedule an appointment.   Discussed the importance of core strengthening, ROM, stretching exercises and how each of these entities is important in decreasing pain and improving long term spine health.  The purpose of physical therapy is to teach you an individualized home exercise program.  These exercises need to be performed every day in order to decrease pain and prevent future occurrences of pain.          [facility] locations:    [facility] [phone]  [address] [place] [address]      [place] [phone]  [address]  [place] [address]        [place] [phone]  [address] [place] [address]      [facility]  [phone]  [address] [place] [address]      [place] [phone]   [address] [place] [address]

## 2021-06-14 NOTE — PROGRESS NOTES
[facility] Rehabilitation Discharge Summary  Patient Name: Massimo Hinson  Date: 1/20/2021  Referral Diagnosis:Nisreen Schulte, TIO  Referral Diagnosis: Neck and Back Pain (Non MedX on Order)  Visit Diagnosis:   1. Neck pain     2. Chronic midline low back pain without sciatica         Goals:  Pt. will demonstrate/verbalize independence in self-management of condition in : 12 weeks Met  Pt. will be independent with home exercise program in : 12 weeks Met  Pt will: improve neck pain in order to lift at work as a  without pain at the end of the day in 12 weeks Variable  Pt will: improve LBP as noted by having less frequent trips to chiropractor for back pain in order to play with SO's son in 12 weeks Met    Patient was seen for 6 visits from 11/27/2020 to 12/30/2020 with 2 missed appointments.    The patient met goals and has demonstrated understanding of and independence in the home program for self-care, and progression to next steps.  He will initiate contact if questions or concerns arise.    Therapy will be discontinued at this time.  The patient will need a new referral to resume.    Thank you for your referral.  Frankie HALL, PT  1/20/2021  8:03 AM

## 2021-06-14 NOTE — PROGRESS NOTES
Previous discharge please disregard. Patient rescheduled to continue with PT.  No change in medical status.    Frankie HALL, PT

## 2021-06-14 NOTE — PROGRESS NOTES
[facility] Discharge Summary    Patient Name: Massimo Hinson  Date: 4/1/2021  Referral Diagnosis: Neck and Back pain  Referring provider: Nisreen Schulte C*      Patient was seen for 7 visits in PT.  See most recent PT note for updated status.     Goals Below were not assessed d/t patient not returning for further PT:    Physical Therapy will be discharged at this time.    Thank you for your referral.  Jessica Franco, DPT, CLT  4/1/2021        [facility] Daily Progress Note  Patient Name: Massimo Hinson  Date of evaluation: 11/27/2020  Today's Date: 1/22/2021  Visit Number: 7 of 16 through 2- per medicaid Cert  Referring provider: Nisreen Schulte CNP  Referral Diagnosis: Neck and Back Pain (Non MedX on Order)  Visit Diagnosis:     ICD-10-CM    1. Neck pain  M54.2    2. Chronic midline low back pain without sciatica  M54.5     G89.29        Assessment:       Patient did come 20 minutes late to appt today, so we focused on a few more ways to work on his dynamic strength. PT encouraged him to get back into a routine as soon as he can. He has been moving and unpacking, so it's been a bit difficult to do this.   He has 2 more visits scheduled right now, will get back into MedX and continued progression of strengthening.      Patient's expectations/goals are: Realistic  Barriers to Learning or Achieving Goals: none    Goals:  Pt. will demonstrate/verbalize independence in self-management of condition in : 12 weeks  Pt. will be independent with home exercise program in : 12 weeks    Pt will: improve neck pain in order to lift at work as a  without pain at the end of the day in 12 weeks  Pt will: improve LBP as noted by having less frequent trips to chiropractor for back pain in order to play with SO's son in 12 weeks         Plan:        Plan for next visit: continue with home program focus - dynamic strengthening with bands, etc.   Continue with all 4  machines.  Focus on strengthening/stability.        Patient is in agreement with PT plan of care and goals.   Continue per POC.       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        Objective:       Lumbar MedX:  Enter Week / Visit #: Wk 3 V 1  Weight (lbs): 106#  Reps (#): 16  ROM (degrees): 0-72  Pain: tired    Cervical MedX:  Enter Week / Visit #: Wk 3 V 1  Weight (lbs): 198# increased to 222# d/t too easy  Reps (#): 20  Time: 135  ROM (degrees): 0-90  Pain: no pain    Exercises:  Exercise #1: treadmill 4:00 warm up  Comment #1: rotary torso 90 sec starting right, 34#  Exercise #2: 4 way neck SB 34#  seat 2, chest pad 3, Flexion, seat 2, chest pad 6  Comment #2: front planks 30-60 sec x3 HEP  Exercise #3: supermans repeats for 30-60 sec x 3 HEP  Comment #3: side planks with top leg lifting and lowering for 30-60 sec x3 HEP  Exercise #4: side stepping band walks with slight squat orange stepping for 30-60 sec x3 HEP  Comment #4: reformer: reverse crunch no springs, 15x  Exercise #5: reformer: standing hip abduction 2R 15x bilat  Comment #5: reformer: standing hip adduction no springs 10x bilat  Exercise #6: reformer: tall kneeling straight arm rows 10x 1R1B  Comment #6: prone press ups  Exercise #7: standing rotation horizontally, diagonally up and diagonally down double blue - given for HEP    Treatment Today     TREATMENT MINUTES COMMENTS   Evaluation     Self-care/ Home management  Ed on using tennis ball for STM on right QL for a few minutes when sore.   Ed on his spine. Discussed that his vertebrae is not shifting around all over the place (he has been told this in the past) PT tried to explain what is meant by this when we say this in the medical world.   Ed on facet joint function, disc function and all the  ligaments/muscles surrounding the spine and how it relates to movement and his spine.   Ed on importance of posture when sitting for long periods of time  Ed on how strengthening/PT can help his pain/spine function   Manual therapy     Neuromuscular Re-education     Therapeutic Activity     Therapeutic Exercises 10 MedX cervical and lumbar     HEP:  Band walk with orange band  Front planks on elbows  Side planks on bent knee with top leg lifting  Supermans    do repetitions for 30 seconds.  Repeat 3 times.  5 days per week.   Gait training     Modality__________________                Total 10 Patient late to appt today   Blank areas are intentional and mean the treatment did not include these items.            Jessica Franco, PT  1/22/2021

## 2021-06-14 NOTE — PROGRESS NOTES
[facility] Daily Progress Note  Patient Name: Massimo Hinson  Date of evaluation: 11/27/2020  Today's Date: 12/30/2020  Visit Number: 6 of 16 through 2- per medicaid Cert  Referring provider: Nisreen Schulte CNP  Referral Diagnosis: Neck and Back Pain (Non MedX on Order)  Visit Diagnosis:     ICD-10-CM    1. Neck pain  M54.2    2. Chronic midline low back pain without sciatica  M54.5     G89.29        Assessment:       Patient showing good tolerance to Medx program. He does demonstrate weakness with these, fatiguing quickly. Working on HEP, he is noticing muscle soreness with these exercises. He is moving currently and has noticed he does get sore from this. Plans to continue with PT here for at least a couple more weeks in the transition period.     Patient's expectations/goals are: Realistic  Barriers to Learning or Achieving Goals: none    Goals:  Pt. will demonstrate/verbalize independence in self-management of condition in : 12 weeks  Pt. will be independent with home exercise program in : 12 weeks    Pt will: improve neck pain in order to lift at work as a  without pain at the end of the day in 12 weeks  Pt will: improve LBP as noted by having less frequent trips to chiropractor for back pain in order to play with SO's son in 12 weeks         Plan:        Plan for next visit: continue with home program  Continue with all 4 machines. Reformer work.  Focus on strengthening/stability.        Patient is in agreement with PT plan of care and goals.   Continue per POC.       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        Objective:       Lumbar MedX:  Enter Week / Visit #: Wk 3 V 1  Weight (lbs): 106#  Reps (#): 16  ROM (degrees): 0-72  Pain: tired    Cervical MedX:  Enter Week /  Visit #: Wk 3 V 1  Weight (lbs): 198# increased to 222# d/t too easy  Reps (#): 20  Time: 135  ROM (degrees): 0-90  Pain: no pain    Exercises:  Exercise #1: treadmill 4:00 warm up  Comment #1: rotary torso 90 sec starting right, 34#  Exercise #2: 4 way neck SB 34#  seat 2, chest pad 3, Flexion, seat 2, chest pad 6  Comment #2: front planks 30-60 sec x3 HEP  Exercise #3: supermans repeats for 30-60 sec x 3 HEP  Comment #3: side planks with top leg lifting and lowering for 30-60 sec x3 HEP  Exercise #4: side stepping band walks with slight squat orange stepping for 30-60 sec x3 HEP  Comment #4: reformer: reverse crunch no springs, 15x  Exercise #5: reformer: standing hip abduction 2R 15x bilat  Comment #5: reformer: standing hip adduction no springs 10x bilat  Exercise #6: reformer: tall kneeling straight arm rows 10x 1R1B    Treatment Today     TREATMENT MINUTES COMMENTS   Evaluation     Self-care/ Home management  Ed on using tennis ball for STM on right QL for a few minutes when sore.   Ed on his spine. Discussed that his vertebrae is not shifting around all over the place (he has been told this in the past) PT tried to explain what is meant by this when we say this in the medical world.   Ed on facet joint function, disc function and all the ligaments/muscles surrounding the spine and how it relates to movement and his spine.   Ed on importance of posture when sitting for long periods of time  Ed on how strengthening/PT can help his pain/spine function   Manual therapy     Neuromuscular Re-education     Therapeutic Activity     Therapeutic Exercises 28 MedX cervical and lumbar     HEP:  Band walk with orange band  Front planks on elbows  Side planks on bent knee with top leg lifting  Supermans    do repetitions for 30 seconds.  Repeat 3 times.  5 days per week.   Gait training     Modality__________________                Total 28    Blank areas are intentional and mean the treatment did not include these items.             Frankie HALL, PT  12/30/2020

## 2021-06-30 NOTE — PROGRESS NOTES
Progress Notes by Jessica Franco PT at 11/27/2020  3:15 PM     Author: Jessica Franco PT Service: -- Author Type: Physical Therapist    Filed: 12/14/2020  9:44 AM Encounter Date: 11/27/2020 Status: Attested Addendum    : Jessica Franco PT (Physical Therapist)    Related Notes: Original Note by Jessica Franco PT (Physical Therapist) filed at 11/27/2020  4:48 PM    Cosigner: Nisreen Schulte CNP at 12/14/2020 11:05 AM    Attestation signed by Nisreen Schulte CNP at 12/14/2020 11:05 AM    Follow therapist recommendations please                    [facility] Certification Request    November 27, 2020      Patient: Massimo Hinson  MR Number: [id number]  YOB: 1990  Date of Visit: 11/27/2020      Dear Nisreen Schulte CNP    Thank you for this referral.   We are seeing Massimo Hinson for Physical Therapy of neck and back pain.    Medicare and/or Medicaid requires physician review and approval of the treatment plan. Please review the plan of care and verify that you agree with the therapy plan of care by co-signing this note.      Plan of Care  Authorization / Certification Start Date: 11/27/20  Authorization / Certification End Date: 02/26/21  Authorization / Certification Number of Visits: 16  Communication with: Referral Source  Patient Related Instruction: Nature of Condition;Treatment plan and rationale;Self Care instruction;Basis of treatment;Body mechanics;Posture;Precautions;Next steps;Expected outcome  Times per Week: 1-2  Number of Weeks: 12  Number of Visits: 16  Discharge Planning: to self care strategies  Precautions / Restrictions : none  Therapeutic Exercise: ROM;Stretching;Strengthening  Neuromuscular Reeducation: posture;kinesio tape;TNE;core  Manual Therapy: soft tissue mobilization;myofascial release;joint mobilization;muscle energy  Modalities: traction;electrical stimulation;TENS      Goals:  Pt. will demonstrate/verbalize  independence in self-management of condition in : 12 weeks  Pt. will be independent with home exercise program in : 12 weeks    Pt will: improve neck pain in order to lift at work as a  without pain at the end of the day in 12 weeks  Pt will: improve LBP as noted by having less frequent trips to chiropractor for back pain in order to play with SO's son in 12 weeks        If you have any questions or concerns, please don't hesitate to call.    Sincerely,      Jessica Franco, PT        Physician recommendation:     ___ Follow therapist's recommendation        ___ Modify therapy      *Physician co-signature indicates they certify the need for these services furnished within this plan and while under their care.      [facility]  Neck /Back Initial Evaluation  Patient Name: Massimo Hinson  Date of evaluation: 11/27/2020  Today's Date: 11/27/2020  Visit Number: 1 of 16 through 2- per medicaid Cert  Referring provider: Nisreen Schulte CNP  Referral Diagnosis: Neck and Back Pain (Non MedX on Order)  Visit Diagnosis:     ICD-10-CM    1. Neck pain  M54.2    2. Chronic midline low back pain without sciatica  M54.5     G89.29        Assessment:        Massimo is a 30 y.o. male who presents to physical therapy today with complaints of neck and back pain. He had a fall just over a year ago that started his neck pain d/t hitting his head and he had a lifting injury 3 years ago while trying to lift a heavy bag. Now he has neck and back pain at a low level most of the time and gets episodic increases of pain depending on his acitvity level. Clinical exam today reveals near hypermobility throughout body, poor posture, s/s consistent with facet dysfunction, no red flag signs. Patient would benefit from further PT in order to improve function.     Patient's expectations/goals are: Realistic  Barriers to Learning or Achieving Goals: none    Goals:  Pt. will demonstrate/verbalize independence in  self-management of condition in : 12 weeks  Pt. will be independent with home exercise program in : 12 weeks    Pt will: improve neck pain in order to lift at work as a  without pain at the end of the day in 12 weeks  Pt will: improve LBP as noted by having less frequent trips to chiropractor for back pain in order to play with SO's son in 12 weeks         Plan:        Plan for next visit: test on cervical medX/back medX. Start home program .  Focus on strengthening/stability.   Patient asks many good questions about what is going on with his spine.      Plan of Care:   Authorization / Certification Start Date: 11/27/20  Authorization / Certification End Date: 02/26/21  Authorization / Certification Number of Visits: 16  Communication with: Referral Source  Patient Related Instruction: Nature of Condition;Treatment plan and rationale;Self Care instruction;Basis of treatment;Body mechanics;Posture;Precautions;Next steps;Expected outcome  Times per Week: 1-2  Number of Weeks: 12  Number of Visits: 16  Discharge Planning: to self care strategies  Precautions / Restrictions : none  Therapeutic Exercise: ROM;Stretching;Strengthening  Neuromuscular Reeducation: posture;kinesio tape;TNE;core  Manual Therapy: soft tissue mobilization;myofascial release;joint mobilization;muscle energy  Modalities: traction;electrical stimulation;TENS    Patient is in agreement with PT plan of care and goals.        Subjective:         History Of Present Illness:  Massimo is a 30 y.o. male who presents to physical therapy today with complaints central neck pain.     A year ago, patient had a possible fall and hit his head and after that he had significant neck pain. Denies numbness/tingling into UEs.    Also, has right sided LBP. It comes and goes. He does have a low level pain at all times.    Patient goes to chiro for his lower back, seems to help. Goes a few times per month.     Patient has pain most of the time.   He has pain in the  mornings.     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    Past Medical History:  Patient Active Problem List   Diagnosis   ? Alcohol dependence (H)     Severity:  Pain Rating Today: aware of neck symptoms, but not bad  Pain rating at worst: gets really ongoing aching at times in his neck.   His back pain can get very bad for a few days depending on what happens.    Quality/Description: sharp, dull, aching     Associated symptoms:                         Numbness: no                        Weakness: no                        Bladder or Bowel loss of control: no                        Fever and/or Chills: no     Functional limitations:   Moving items for work is challenging. Does have a 4.5 year old (gf's child) that he likes to play with and at times can feel pain with these activities.            Objective:      Note: Items left blank indicates the item was not performed or not indicated at the time of the evaluation.    Lumbopelvic Examination  1. Neck pain     2. Chronic midline low back pain without sciatica         Precautions/Restrictions: None    Involved side: Right    Observation: poor posture    Lumbar ROM:    Flexion: Fingers to floor  Extension: WNL  Side bending: WNL  Combined movement to touch back of knee - mildly sticks/clicks    Neck ROM:   All WNL, but patient complains of pain with rotation when sitting in his comfortable (poor) posture. When he sits upright, he has no pain with rotation.    Lower Extremity Strength:     Hip flexion: 5/5 bilat  Knee flexion: 5/5 bilat  Knee extension: 5/5 bilat  DF: 5/5 bilat  PF: 5/5 bilat  Great Toe Extension: 5/5/ bilat  Can heel and toe walk    Sensation normal    Lumbar Special Tests:     Quadrant Test: mild pain/click  SLR:90 deg bilat  Crossover response: negative  Slump: negative  SHANNAN: negative    Repeated Motion Testing: NA    Passive Mobility - Joint Integrity:mild hypermobility in lumbar spine, good mobility in thoracic and  cervical spine, mild pain with unilateral PA mobility at C5-6    LE Screen: good mobility    Palpation: tender to right QL, right C5-6 paraspinals.    Treatment Today     TREATMENT MINUTES COMMENTS   Evaluation 25 Neck/back   Self-care/ Home management 30 Ed on using tennis ball for STM on right QL for a few minutes when sore.   Ed on his spine. Discussed that his vertebrae is not shifting around all over the place (he has been told this in the past) PT tried to explain what is meant by this when we say this in the medical world.   Ed on facet joint function, disc function and all the ligaments/muscles surrounding the spine and how it relates to movement and his spine.   Ed on importance of posture when sitting for long periods of time  Ed on how strengthening/PT can help his pain/spine function   Manual therapy     Neuromuscular Re-education     Therapeutic Activity     Therapeutic Exercises     Gait training     Modality__________________                Total 55    Blank areas are intentional and mean the treatment did not include these items.   PT Evaluation Code: (Please list factors)  Patient History/Comorbidities:   Patient Active Problem List   Diagnosis   ? Alcohol dependence (H)   Examination: neck and back pain  Clinical Presentation: stable  Clinical Decision Making: low    Patient History/  Comorbidities Examination  (body structures and functions, activity limitations, and/or participation restrictions) Clinical Presentation Clinical Decision Making (Complexity)   No documented Comorbidities or personal factors 1-2 Elements Stable and/or uncomplicated Low   1-2 documented comorbidities or personal factor 3 Elements Evolving clinical presentation with changing characteristics Moderate   3-4 documented comorbidities or personal factors 4 or more Unstable and unpredictable High Jessica Franco  11/27/2020  10:43 AM

## 2022-08-05 ENCOUNTER — HOSPITAL ENCOUNTER (OUTPATIENT)
Dept: MRI IMAGING | Facility: CLINIC | Age: 32
Discharge: HOME | End: 2022-08-05
Attending: PHYSICIAN ASSISTANT | Admitting: PHYSICIAN ASSISTANT
Payer: COMMERCIAL

## 2022-08-05 DIAGNOSIS — M25.521 RIGHT ELBOW PAIN: ICD-10-CM

## 2022-08-05 PROCEDURE — 73221 MRI JOINT UPR EXTREM W/O DYE: CPT | Mod: RT

## 2022-08-05 PROCEDURE — 73221 MRI JOINT UPR EXTREM W/O DYE: CPT | Mod: 26 | Performed by: RADIOLOGY

## 2023-05-25 ENCOUNTER — OFFICE VISIT (OUTPATIENT)
Dept: FAMILY MEDICINE | Facility: CLINIC | Age: 33
End: 2023-05-25

## 2023-05-25 VITALS
SYSTOLIC BLOOD PRESSURE: 103 MMHG | DIASTOLIC BLOOD PRESSURE: 66 MMHG | HEART RATE: 104 BPM | RESPIRATION RATE: 18 BRPM | BODY MASS INDEX: 21 KG/M2 | TEMPERATURE: 98.2 F | WEIGHT: 141.8 LBS | HEIGHT: 69 IN | OXYGEN SATURATION: 95 %

## 2023-05-25 DIAGNOSIS — M25.562 ACUTE PAIN OF LEFT KNEE: Primary | ICD-10-CM

## 2023-05-25 DIAGNOSIS — Z98.890 HISTORY OF REPAIR OF ACL: ICD-10-CM

## 2023-05-25 DIAGNOSIS — S86.912A STRAIN OF LEFT KNEE, INITIAL ENCOUNTER: ICD-10-CM

## 2023-05-25 PROCEDURE — 99213 OFFICE O/P EST LOW 20 MIN: CPT | Performed by: FAMILY MEDICINE

## 2023-05-25 ASSESSMENT — PAIN SCALES - GENERAL: PAINLEVEL: MODERATE PAIN (5)

## 2023-05-25 NOTE — PROGRESS NOTES
Assessment & Plan     Acute pain of left knee  Strain of left knee, initial encounter  History of repair of ACL  Exam is fairly normal, other than mild joint effusion  Likely more of a strain, but exam may be effected by scar tissue.   Has a history of ACL repair x 2. Offered MRI but as he is better, decided to hold off for now, he can call if he changes his mind.   . - Orthopedic  Referral; Future    Patient Instructions   Wear your brace x 2 weeks  See ortho for the knee if desired                    Michelle Forrest MD  [facility]    Sarmad Keys is a 32 year old, presenting for the following health issues:  Knee Pain        Roomed by Nohemi     Knee Pain    History of Present Illness       Reason for visit:  Knee injury  Symptom onset:  3-7 days ago  Symptoms include:  Pain and slight weakness on ousode of the left knee  Symptom intensity:  Moderate  Symptom progression:  Improving  Had these symptoms before:  Yes  Has tried/received treatment for these symptoms:  Yes  Previous treatment was successful:  Yes  Prior treatment description:  Acl tear 2008, acl tear same knee / muniscus tear 2009  What makes it worse:  Planting hard/ pivoting  What makes it better:  Ice/ relaxation    He eats 4 or more servings of fruits and vegetables daily.He consumes 1 sweetened beverage(s) daily.He exercises with enough effort to increase his heart rate 60 or more minutes per day.  He exercises with enough effort to increase his heart rate 6 days per week.   He is taking medications regularly.     Had ACL tears in left knee 2008, was repaired, injured fairly immediately, and 2009 had ACL and meniscus repaired.   Did fine until last week 8 days ago, playing softball and twisted knee and went down on it twisted. Felt and heard a pop and immediate pain. Hurts to turn it, hard to pivot. Has given out, no locking up.   Pain is not as bad, no swelling or bruising.       Review of Systems   As  "above      Objective    /66   Pulse 104   Temp 98.2  F (36.8  C)   Resp 18   Ht 1.753 m (5' 9\")   Wt 64.3 kg (141 lb 12.8 oz)   SpO2 95%   BMI 20.94 kg/m    Body mass index is 20.94 kg/m .  Physical Exam   General: appears well, in no distress   MS: knee exam:  left  knee without erythema,  ecchymosis, warmth, there is mild edema on lateral side. There is negative joint line tenderness. negative crepitus with flexion and extension, full ROM. The ACL, PCL, MCL and LCL are intact. Meniscal provocative maneuvers negative. normal gait.                 "